# Patient Record
Sex: FEMALE | Race: OTHER | NOT HISPANIC OR LATINO | Employment: FULL TIME | URBAN - METROPOLITAN AREA
[De-identification: names, ages, dates, MRNs, and addresses within clinical notes are randomized per-mention and may not be internally consistent; named-entity substitution may affect disease eponyms.]

---

## 2019-04-15 ENCOUNTER — APPOINTMENT (EMERGENCY)
Dept: RADIOLOGY | Facility: HOSPITAL | Age: 34
End: 2019-04-15
Payer: COMMERCIAL

## 2019-04-15 ENCOUNTER — HOSPITAL ENCOUNTER (EMERGENCY)
Facility: HOSPITAL | Age: 34
Discharge: HOME | End: 2019-04-15
Attending: EMERGENCY MEDICINE
Payer: COMMERCIAL

## 2019-04-15 VITALS
TEMPERATURE: 98.7 F | WEIGHT: 220.24 LBS | HEART RATE: 60 BPM | OXYGEN SATURATION: 98 % | RESPIRATION RATE: 18 BRPM | DIASTOLIC BLOOD PRESSURE: 80 MMHG | SYSTOLIC BLOOD PRESSURE: 148 MMHG

## 2019-04-15 DIAGNOSIS — R07.89 ATYPICAL CHEST PAIN: Primary | ICD-10-CM

## 2019-04-15 LAB
ALBUMIN SERPL BCP-MCNC: 3.6 G/DL (ref 3.5–5)
ALP SERPL-CCNC: 80 U/L (ref 46–116)
ALT SERPL W P-5'-P-CCNC: 42 U/L (ref 12–78)
ANION GAP SERPL CALCULATED.3IONS-SCNC: 9 MMOL/L (ref 4–13)
AST SERPL W P-5'-P-CCNC: 27 U/L (ref 5–45)
ATRIAL RATE: 86 BPM
BASOPHILS # BLD AUTO: 0.04 THOUSANDS/ΜL (ref 0–0.1)
BASOPHILS NFR BLD AUTO: 0 % (ref 0–1)
BILIRUB DIRECT SERPL-MCNC: 0.14 MG/DL (ref 0–0.2)
BILIRUB SERPL-MCNC: 0.98 MG/DL (ref 0.2–1)
BUN SERPL-MCNC: 12 MG/DL (ref 5–25)
CALCIUM SERPL-MCNC: 9.2 MG/DL (ref 8.3–10.1)
CHLORIDE SERPL-SCNC: 103 MMOL/L (ref 100–108)
CO2 SERPL-SCNC: 30 MMOL/L (ref 21–32)
CREAT SERPL-MCNC: 0.82 MG/DL (ref 0.6–1.3)
EOSINOPHIL # BLD AUTO: 0.05 THOUSAND/ΜL (ref 0–0.61)
EOSINOPHIL NFR BLD AUTO: 1 % (ref 0–6)
ERYTHROCYTE [DISTWIDTH] IN BLOOD BY AUTOMATED COUNT: 14.4 % (ref 11.6–15.1)
EXT PREG TEST URINE: NEGATIVE
GFR SERPL CREATININE-BSD FRML MDRD: 95 ML/MIN/1.73SQ M
GLUCOSE SERPL-MCNC: 111 MG/DL (ref 65–140)
HCT VFR BLD AUTO: 35.8 % (ref 34.8–46.1)
HGB BLD-MCNC: 11.6 G/DL (ref 11.5–15.4)
IMM GRANULOCYTES # BLD AUTO: 0.05 THOUSAND/UL (ref 0–0.2)
IMM GRANULOCYTES NFR BLD AUTO: 1 % (ref 0–2)
LIPASE SERPL-CCNC: 100 U/L (ref 73–393)
LYMPHOCYTES # BLD AUTO: 1.67 THOUSANDS/ΜL (ref 0.6–4.47)
LYMPHOCYTES NFR BLD AUTO: 16 % (ref 14–44)
MCH RBC QN AUTO: 27.9 PG (ref 26.8–34.3)
MCHC RBC AUTO-ENTMCNC: 32.4 G/DL (ref 31.4–37.4)
MCV RBC AUTO: 86 FL (ref 82–98)
MONOCYTES # BLD AUTO: 1.01 THOUSAND/ΜL (ref 0.17–1.22)
MONOCYTES NFR BLD AUTO: 10 % (ref 4–12)
NEUTROPHILS # BLD AUTO: 7.72 THOUSANDS/ΜL (ref 1.85–7.62)
NEUTS SEG NFR BLD AUTO: 72 % (ref 43–75)
NRBC BLD AUTO-RTO: 0 /100 WBCS
P AXIS: 50 DEGREES
PLATELET # BLD AUTO: 359 THOUSANDS/UL (ref 149–390)
PMV BLD AUTO: 10.5 FL (ref 8.9–12.7)
POTASSIUM SERPL-SCNC: 3.1 MMOL/L (ref 3.5–5.3)
PR INTERVAL: 156 MS
PROT SERPL-MCNC: 7.9 G/DL (ref 6.4–8.2)
QRS AXIS: 54 DEGREES
QRSD INTERVAL: 86 MS
QT INTERVAL: 380 MS
QTC INTERVAL: 454 MS
RBC # BLD AUTO: 4.16 MILLION/UL (ref 3.81–5.12)
SODIUM SERPL-SCNC: 142 MMOL/L (ref 136–145)
T WAVE AXIS: 48 DEGREES
TROPONIN I SERPL-MCNC: <0.02 NG/ML
VENTRICULAR RATE: 86 BPM
WBC # BLD AUTO: 10.54 THOUSAND/UL (ref 4.31–10.16)

## 2019-04-15 PROCEDURE — 99285 EMERGENCY DEPT VISIT HI MDM: CPT

## 2019-04-15 PROCEDURE — 99284 EMERGENCY DEPT VISIT MOD MDM: CPT | Performed by: EMERGENCY MEDICINE

## 2019-04-15 PROCEDURE — 81025 URINE PREGNANCY TEST: CPT | Performed by: EMERGENCY MEDICINE

## 2019-04-15 PROCEDURE — 96361 HYDRATE IV INFUSION ADD-ON: CPT

## 2019-04-15 PROCEDURE — 71046 X-RAY EXAM CHEST 2 VIEWS: CPT

## 2019-04-15 PROCEDURE — 93010 ELECTROCARDIOGRAM REPORT: CPT | Performed by: INTERNAL MEDICINE

## 2019-04-15 PROCEDURE — 80076 HEPATIC FUNCTION PANEL: CPT | Performed by: EMERGENCY MEDICINE

## 2019-04-15 PROCEDURE — 36415 COLL VENOUS BLD VENIPUNCTURE: CPT | Performed by: EMERGENCY MEDICINE

## 2019-04-15 PROCEDURE — 80048 BASIC METABOLIC PNL TOTAL CA: CPT | Performed by: EMERGENCY MEDICINE

## 2019-04-15 PROCEDURE — 83690 ASSAY OF LIPASE: CPT | Performed by: EMERGENCY MEDICINE

## 2019-04-15 PROCEDURE — 96374 THER/PROPH/DIAG INJ IV PUSH: CPT

## 2019-04-15 PROCEDURE — 93005 ELECTROCARDIOGRAM TRACING: CPT

## 2019-04-15 PROCEDURE — 84484 ASSAY OF TROPONIN QUANT: CPT | Performed by: EMERGENCY MEDICINE

## 2019-04-15 PROCEDURE — 85025 COMPLETE CBC W/AUTO DIFF WBC: CPT | Performed by: EMERGENCY MEDICINE

## 2019-04-15 RX ORDER — ONDANSETRON 2 MG/ML
4 INJECTION INTRAMUSCULAR; INTRAVENOUS ONCE
Status: COMPLETED | OUTPATIENT
Start: 2019-04-15 | End: 2019-04-15

## 2019-04-15 RX ORDER — LORAZEPAM 0.5 MG/1
0.5 TABLET ORAL ONCE
Status: DISCONTINUED | OUTPATIENT
Start: 2019-04-15 | End: 2019-04-15

## 2019-04-15 RX ORDER — POTASSIUM CHLORIDE 20 MEQ/1
40 TABLET, EXTENDED RELEASE ORAL ONCE
Status: COMPLETED | OUTPATIENT
Start: 2019-04-15 | End: 2019-04-15

## 2019-04-15 RX ADMIN — ONDANSETRON 4 MG: 2 INJECTION INTRAMUSCULAR; INTRAVENOUS at 17:00

## 2019-04-15 RX ADMIN — SODIUM CHLORIDE 1000 ML: 0.9 INJECTION, SOLUTION INTRAVENOUS at 16:34

## 2019-04-15 RX ADMIN — POTASSIUM CHLORIDE 40 MEQ: 1500 TABLET, EXTENDED RELEASE ORAL at 17:45

## 2022-06-28 ENCOUNTER — TELEPHONE (OUTPATIENT)
Dept: HEMATOLOGY ONCOLOGY | Facility: CLINIC | Age: 37
End: 2022-06-28

## 2022-06-28 NOTE — TELEPHONE ENCOUNTER
New Patient Intake Form   Patient Details:    Mc Thapa  1985    Appointment Information   Who is calling to schedule? Patient   If not self, what is the caller's name?   self   DID YOU CONFIRM INSURANCE WITH PATIENT? yes   Referring provider Dr Ashley Oliver   What is the diagnosis? White cells & platelets elevated     Is there a confirmed tissue diagnosis?   no     Is there a biopsy ordered or pending? Please specify dates  If yes, route to /OCC   no     Is patient aware of diagnosis? yes     Have you had any imaging or labs done? If yes, where? (If imaging done outside of [facility], please remind patient to bring a disk ) Yes at [facility]     If imaging done at outside facility, did you instruct patient to obtain discs and bring to visit? yes   Have you been seen by another Oncologist/Hematologist?  If so, who and where? no   Are the records in [facility] or Care Everywhere? Yes   Does the patient have records at another facility/hospital?    If yes, Name of facility, city and state where facility is located  Yes, [facility]     Did you instruct patient to have records faxed to rightfax and provide rightfax number?   yes   Preferred [place]   Is the patient willing to be seen by another provider? (This is for breast patients only) n/a     Did you send new patient paperwork? Email or mail?  Not required   Miscellaneous Information: Scheduled appmt 6/29 9AM Paula at [facility]

## 2022-06-29 ENCOUNTER — CONSULT (OUTPATIENT)
Dept: HEMATOLOGY ONCOLOGY | Facility: CLINIC | Age: 37
End: 2022-06-29
Payer: COMMERCIAL

## 2022-06-29 ENCOUNTER — TELEPHONE (OUTPATIENT)
Dept: HEMATOLOGY ONCOLOGY | Facility: CLINIC | Age: 37
End: 2022-06-29

## 2022-06-29 VITALS
TEMPERATURE: 98.4 F | HEIGHT: 67 IN | HEART RATE: 96 BPM | WEIGHT: 230 LBS | OXYGEN SATURATION: 97 % | DIASTOLIC BLOOD PRESSURE: 78 MMHG | RESPIRATION RATE: 16 BRPM | SYSTOLIC BLOOD PRESSURE: 140 MMHG | BODY MASS INDEX: 36.1 KG/M2

## 2022-06-29 DIAGNOSIS — D50.0 IRON DEFICIENCY ANEMIA DUE TO CHRONIC BLOOD LOSS: Primary | ICD-10-CM

## 2022-06-29 DIAGNOSIS — D50.0 IRON DEFICIENCY ANEMIA DUE TO CHRONIC BLOOD LOSS: ICD-10-CM

## 2022-06-29 DIAGNOSIS — D72.829 LEUKOCYTOSIS, UNSPECIFIED TYPE: Primary | ICD-10-CM

## 2022-06-29 DIAGNOSIS — E53.8 VITAMIN B 12 DEFICIENCY: ICD-10-CM

## 2022-06-29 DIAGNOSIS — E53.8 LOW FOLATE: ICD-10-CM

## 2022-06-29 DIAGNOSIS — D75.839 THROMBOCYTOSIS: ICD-10-CM

## 2022-06-29 PROCEDURE — 99244 OFF/OP CNSLTJ NEW/EST MOD 40: CPT | Performed by: NURSE PRACTITIONER

## 2022-06-29 RX ORDER — FERROUS SULFATE 325(65) MG
325 TABLET ORAL
COMMUNITY

## 2022-06-29 RX ORDER — SODIUM CHLORIDE 9 MG/ML
20 INJECTION, SOLUTION INTRAVENOUS ONCE
Status: CANCELLED | OUTPATIENT
Start: 2022-07-22

## 2022-06-29 RX ORDER — MELATONIN
1000 DAILY
COMMUNITY

## 2022-06-29 NOTE — PROGRESS NOTES
[address] [facility] [address] [address] [phone]  Hematology Ambulatory Consult  Mc Thapa, 1985, [id number]  6/29/2022      Assessment and Plan   1  Leukocytosis, unspecified type  2  Thrombocytosis  3  Low folate  4  Vitamin B 12 deficiency  5  Iron deficiency anemia due to chronic blood loss   Patient is a 29-year-old female with a history of anxiety, hypothyroid, migraines, vitamin-D deficiency and iron-deficiency who was referred to us for further evaluation of thrombocytosis and leukocytosis  Patient states she has a history of anemia since having her daughter 13 years ago  She reported postpartum blood loss requiring blood transfusions  She has been on oral iron supplementation on and off however this causes constipation  She reports having heavy menses changing her pads every 2 hours on her heaviest days  Her menses last 5-6 days every 21 days  She does have increased fatigue and dizziness during her menses  Otherwise he reports frequent headaches and anxiety  Per chart review CBC reveals an intermittent elevated white blood cell count since at least 2019 ranging between 10 54-12 1, hemoglobin most recently 11 4 otherwise has been normal, platelets 084-115, otherwise normal differential   Patient's most recent ferritin level was 9 however I do not have an iron saturation  I suspect it will be low  We will request a full iron panel now  We discussed etiologies of thrombocytosis and leukocytosis including nutritional deficiencies, inflammatory conditions, less likely a bone marrow dysfunction or malignancy  Patient does not have a history of smoking, gastric bypass, blood in her stools and reports adequate dietary intake  She has not had a colonoscopy  I suspect her iron-deficiency anemia is likely secondary to heavy menses  She has a consult with gynecology to discuss management in the near future     In anticipation that her iron saturation and ferritin levels will be low we will make arrangements for Venofer iron infusions  Patient states she does have a history of anxiety and adverse reactions to some medications  We will administer at a lower dose 200 mg IV x5  I discussed indications and adverse reactions including shortness of breath, chest heaviness, headache, muscle cramping etc  I advised her to contact our office if she has any of these symptoms we will add premedications to the next infusion  I will also check B12 level and add if needed  Otherwise we will plan to see patient in 6 months with three-month interval labs managing as indicated  Patient verbalized understanding and agreement with the plan  - Folate; Future  - Iron Panel (Includes Ferritin, Iron Sat%, Iron, and TIBC); Standing  - Vitamin B12; Future  - CBC and differential; Future  - Iron Panel (Includes Ferritin, Iron Sat%, Iron, and TIBC)    Barrier(s) to care: None  The patient is able to self care  [address] [place] [address]    Subjective     Chief Complaint   Patient presents with    Consult     Referring provider    No referring provider defined for this encounter  History of present illness:  Patient is a 51-year-old female with a history of anxiety, hypothyroid, migraines, vitamin-D deficiency and iron-deficiency who was referred to us for further evaluation of thrombocytosis and leukocytosis  06/29/22:  Clinically stable    Review of Systems   Constitutional: Positive for fatigue  Negative for activity change, appetite change, fever and unexpected weight change  Respiratory: Negative for cough and shortness of breath  Cardiovascular: Negative for chest pain and leg swelling  Gastrointestinal: Positive for constipation  Negative for abdominal pain, diarrhea and nausea  Endocrine: Negative for cold intolerance and heat intolerance  Musculoskeletal: Negative for arthralgias and myalgias  Skin: Negative  Neurological: Positive for dizziness and headaches  Negative for weakness  Hematological: Negative for adenopathy  Does not bruise/bleed easily  Past Medical History:   Diagnosis Date    Anemia      No past surgical history on file  No family history on file  Social History     Socioeconomic History    Marital status: Single     Spouse name: None    Number of children: None    Years of education: None    Highest education level: None   Occupational History    None   Tobacco Use    Smoking status: Never Smoker    Smokeless tobacco: Never Used   Substance and Sexual Activity    Alcohol use: Never    Drug use: Never    Sexual activity: None   Other Topics Concern    None   Social History Narrative    None     Social Determinants of Health     Financial Resource Strain: Not on file   Food Insecurity: Not on file   Transportation Needs: Not on file   Physical Activity: Not on file   Stress: Not on file   Social Connections: Not on file   Intimate Partner Violence: Not on file   Housing Stability: Not on file         Current Outpatient Medications:     cholecalciferol (VITAMIN D3) 1,000 units tablet, Take 1,000 Units by mouth daily, Disp: , Rfl:     Cholecalciferol 1 25 MG (53727 UT) capsule, Take 1 capsule by mouth once a week, Disp: , Rfl:     ferrous sulfate 325 (65 Fe) mg tablet, Take 325 mg by mouth daily with breakfast, Disp: , Rfl:     ibuprofen (MOTRIN) 100 mg/5 mL suspension, Take by mouth every 6 (six) hours as needed for mild pain, Disp: , Rfl:   Allergies   Allergen Reactions    Psyllium Itching     Chest tightness, itchy throat, sneezing       Objective   /78 (BP Location: Left arm, Patient Position: Sitting, Cuff Size: Standard)   Pulse 96   Temp 98 4 °F (36 9 °C) (Temporal)   Resp 16   Ht 5' 7" (1 702 m)   Wt 104 kg (230 lb)   SpO2 97%   BMI 36 02 kg/m²   Physical Exam  Vitals reviewed  Constitutional:       Appearance: Normal appearance  She is well-developed  HENT:      Head: Normocephalic and atraumatic  Eyes:      Conjunctiva/sclera: Conjunctivae normal       Pupils: Pupils are equal, round, and reactive to light  Cardiovascular:      Rate and Rhythm: Normal rate and regular rhythm  Pulses: Normal pulses  Heart sounds: Normal heart sounds  No murmur heard  Pulmonary:      Effort: Pulmonary effort is normal  No respiratory distress  Breath sounds: Normal breath sounds  Abdominal:      General: Bowel sounds are normal       Palpations: Abdomen is soft  Musculoskeletal:         General: Normal range of motion  Cervical back: Normal range of motion and neck supple  Lymphadenopathy:      Cervical: No cervical adenopathy  Skin:     General: Skin is warm and dry  Capillary Refill: Capillary refill takes less than 2 seconds  Neurological:      Mental Status: She is alert and oriented to person, place, and time  Psychiatric:         Behavior: Behavior normal        Please note: This report has been generated by a voice recognition software system  Therefore there may be syntax, spelling, and/or grammatical errors  Please call if you have any questions

## 2022-06-29 NOTE — TELEPHONE ENCOUNTER
Adriana Murray! ! I just got off the phone with patient, she is okay to do any day as long as its in the morning to be seen sooner than 7/27  Thank you!

## 2022-06-29 NOTE — TELEPHONE ENCOUNTER
While we try to accommodate patient requests, our priority is to schedule treatment according to Doctor's orders and site availability  1  Does the Provider use the intake sheet or checkout note? no  2  What would be a preferred day of the week that would work best for your infusion appointment? Wednesday  3  Do you prefer mornings or afternoons for your appointments? Morning  4  Are there any days or dates that do not work for your schedule, including any upcoming vacations? none  5  We are going to try our best to schedule you at the infusion center closest to your home  In the event that we are unable to what would be your next preferred infusion site or sites? 1  [facility]  2  [facility]  3  [facility]    6  Do you have transportation to take you to all of your appointments? yes  7  Would you like the infusion center to draw labs from your port? (disregard if patient doesn't have a port or need labs for infusion appointment) n/a    Pt  Will need a 26 week f/u mathewt   Clifford Becker

## 2022-07-21 ENCOUNTER — APPOINTMENT (OUTPATIENT)
Dept: LAB | Facility: CLINIC | Age: 37
End: 2022-07-21
Payer: COMMERCIAL

## 2022-07-21 DIAGNOSIS — E53.8 VITAMIN B 12 DEFICIENCY: ICD-10-CM

## 2022-07-21 DIAGNOSIS — D72.829 LEUKOCYTOSIS, UNSPECIFIED TYPE: ICD-10-CM

## 2022-07-21 DIAGNOSIS — E53.8 LOW FOLATE: ICD-10-CM

## 2022-07-21 DIAGNOSIS — D75.839 THROMBOCYTOSIS: ICD-10-CM

## 2022-07-21 DIAGNOSIS — D50.0 IRON DEFICIENCY ANEMIA DUE TO CHRONIC BLOOD LOSS: ICD-10-CM

## 2022-07-21 LAB
FERRITIN SERPL-MCNC: 15 NG/ML (ref 8–388)
FOLATE SERPL-MCNC: 14.6 NG/ML (ref 3.1–17.5)
IRON SATN MFR SERPL: 6 % (ref 15–50)
IRON SERPL-MCNC: 26 UG/DL (ref 50–170)
TIBC SERPL-MCNC: 409 UG/DL (ref 250–450)
VIT B12 SERPL-MCNC: 220 PG/ML (ref 100–900)

## 2022-07-21 PROCEDURE — 82746 ASSAY OF FOLIC ACID SERUM: CPT

## 2022-07-21 PROCEDURE — 82728 ASSAY OF FERRITIN: CPT

## 2022-07-21 PROCEDURE — 83550 IRON BINDING TEST: CPT

## 2022-07-21 PROCEDURE — 82607 VITAMIN B-12: CPT

## 2022-07-21 PROCEDURE — 36415 COLL VENOUS BLD VENIPUNCTURE: CPT

## 2022-07-21 PROCEDURE — 83540 ASSAY OF IRON: CPT

## 2022-07-22 ENCOUNTER — HOSPITAL ENCOUNTER (OUTPATIENT)
Dept: INFUSION CENTER | Facility: HOSPITAL | Age: 37
Discharge: HOME | End: 2022-07-22
Attending: INTERNAL MEDICINE
Payer: COMMERCIAL

## 2022-07-22 VITALS
DIASTOLIC BLOOD PRESSURE: 72 MMHG | HEART RATE: 86 BPM | RESPIRATION RATE: 18 BRPM | SYSTOLIC BLOOD PRESSURE: 128 MMHG | TEMPERATURE: 96.5 F

## 2022-07-22 DIAGNOSIS — D50.0 IRON DEFICIENCY ANEMIA DUE TO CHRONIC BLOOD LOSS: Primary | ICD-10-CM

## 2022-07-22 PROCEDURE — 96365 THER/PROPH/DIAG IV INF INIT: CPT

## 2022-07-22 RX ORDER — SODIUM CHLORIDE 9 MG/ML
20 INJECTION, SOLUTION INTRAVENOUS ONCE
Status: COMPLETED | OUTPATIENT
Start: 2022-07-22 | End: 2022-07-22

## 2022-07-22 RX ORDER — SODIUM CHLORIDE 9 MG/ML
20 INJECTION, SOLUTION INTRAVENOUS ONCE
Status: CANCELLED | OUTPATIENT
Start: 2022-07-27

## 2022-07-22 RX ADMIN — IRON SUCROSE 200 MG: 20 INJECTION, SOLUTION INTRAVENOUS at 09:08

## 2022-07-22 RX ADMIN — SODIUM CHLORIDE 20 ML/HR: 0.9 INJECTION, SOLUTION INTRAVENOUS at 09:07

## 2022-07-27 ENCOUNTER — HOSPITAL ENCOUNTER (OUTPATIENT)
Dept: INFUSION CENTER | Facility: HOSPITAL | Age: 37
Discharge: HOME | End: 2022-07-27
Attending: INTERNAL MEDICINE
Payer: COMMERCIAL

## 2022-07-27 VITALS
TEMPERATURE: 97.5 F | DIASTOLIC BLOOD PRESSURE: 66 MMHG | RESPIRATION RATE: 18 BRPM | HEART RATE: 82 BPM | SYSTOLIC BLOOD PRESSURE: 124 MMHG

## 2022-07-27 DIAGNOSIS — D50.0 IRON DEFICIENCY ANEMIA DUE TO CHRONIC BLOOD LOSS: Primary | ICD-10-CM

## 2022-07-27 PROCEDURE — 96365 THER/PROPH/DIAG IV INF INIT: CPT

## 2022-07-27 RX ORDER — SODIUM CHLORIDE 9 MG/ML
20 INJECTION, SOLUTION INTRAVENOUS ONCE
Status: CANCELLED | OUTPATIENT
Start: 2022-08-03

## 2022-07-27 RX ORDER — SODIUM CHLORIDE 9 MG/ML
20 INJECTION, SOLUTION INTRAVENOUS ONCE
Status: COMPLETED | OUTPATIENT
Start: 2022-07-27 | End: 2022-07-27

## 2022-07-27 RX ADMIN — IRON SUCROSE 200 MG: 20 INJECTION, SOLUTION INTRAVENOUS at 09:52

## 2022-07-27 RX ADMIN — SODIUM CHLORIDE 20 ML/HR: 9 INJECTION, SOLUTION INTRAVENOUS at 09:52

## 2022-08-04 ENCOUNTER — HOSPITAL ENCOUNTER (OUTPATIENT)
Dept: INFUSION CENTER | Facility: HOSPITAL | Age: 37
Discharge: HOME | End: 2022-08-04
Attending: INTERNAL MEDICINE
Payer: COMMERCIAL

## 2022-08-04 VITALS
TEMPERATURE: 96.9 F | SYSTOLIC BLOOD PRESSURE: 128 MMHG | DIASTOLIC BLOOD PRESSURE: 64 MMHG | RESPIRATION RATE: 18 BRPM | HEART RATE: 84 BPM

## 2022-08-04 DIAGNOSIS — D50.0 IRON DEFICIENCY ANEMIA DUE TO CHRONIC BLOOD LOSS: Primary | ICD-10-CM

## 2022-08-04 PROCEDURE — 96365 THER/PROPH/DIAG IV INF INIT: CPT

## 2022-08-04 RX ORDER — SODIUM CHLORIDE 9 MG/ML
20 INJECTION, SOLUTION INTRAVENOUS ONCE
Status: CANCELLED | OUTPATIENT
Start: 2022-08-10

## 2022-08-04 RX ORDER — SODIUM CHLORIDE 9 MG/ML
20 INJECTION, SOLUTION INTRAVENOUS ONCE
Status: COMPLETED | OUTPATIENT
Start: 2022-08-04 | End: 2022-08-04

## 2022-08-04 RX ADMIN — IRON SUCROSE 200 MG: 20 INJECTION, SOLUTION INTRAVENOUS at 08:24

## 2022-08-04 RX ADMIN — SODIUM CHLORIDE 20 ML/HR: 0.9 INJECTION, SOLUTION INTRAVENOUS at 08:24

## 2022-08-10 ENCOUNTER — HOSPITAL ENCOUNTER (OUTPATIENT)
Dept: INFUSION CENTER | Facility: HOSPITAL | Age: 37
Discharge: HOME | End: 2022-08-10
Attending: INTERNAL MEDICINE
Payer: COMMERCIAL

## 2022-08-10 VITALS
DIASTOLIC BLOOD PRESSURE: 68 MMHG | SYSTOLIC BLOOD PRESSURE: 122 MMHG | TEMPERATURE: 97.3 F | RESPIRATION RATE: 18 BRPM | HEART RATE: 76 BPM

## 2022-08-10 DIAGNOSIS — D50.0 IRON DEFICIENCY ANEMIA DUE TO CHRONIC BLOOD LOSS: Primary | ICD-10-CM

## 2022-08-10 PROCEDURE — 96365 THER/PROPH/DIAG IV INF INIT: CPT

## 2022-08-10 RX ORDER — SODIUM CHLORIDE 9 MG/ML
20 INJECTION, SOLUTION INTRAVENOUS ONCE
Status: COMPLETED | OUTPATIENT
Start: 2022-08-10 | End: 2022-08-10

## 2022-08-10 RX ORDER — SODIUM CHLORIDE 9 MG/ML
20 INJECTION, SOLUTION INTRAVENOUS ONCE
Status: CANCELLED | OUTPATIENT
Start: 2022-08-17

## 2022-08-10 RX ADMIN — IRON SUCROSE 200 MG: 20 INJECTION, SOLUTION INTRAVENOUS at 09:50

## 2022-08-10 RX ADMIN — SODIUM CHLORIDE 20 ML/HR: 0.9 INJECTION, SOLUTION INTRAVENOUS at 09:50

## 2022-08-17 ENCOUNTER — HOSPITAL ENCOUNTER (OUTPATIENT)
Dept: INFUSION CENTER | Facility: HOSPITAL | Age: 37
Discharge: HOME | End: 2022-08-17
Attending: INTERNAL MEDICINE
Payer: COMMERCIAL

## 2022-08-17 VITALS
HEART RATE: 82 BPM | SYSTOLIC BLOOD PRESSURE: 144 MMHG | TEMPERATURE: 98.2 F | DIASTOLIC BLOOD PRESSURE: 85 MMHG | RESPIRATION RATE: 18 BRPM

## 2022-08-17 DIAGNOSIS — D50.0 IRON DEFICIENCY ANEMIA DUE TO CHRONIC BLOOD LOSS: Primary | ICD-10-CM

## 2022-08-17 PROCEDURE — 96365 THER/PROPH/DIAG IV INF INIT: CPT

## 2022-08-17 RX ORDER — SODIUM CHLORIDE 9 MG/ML
20 INJECTION, SOLUTION INTRAVENOUS ONCE
Status: COMPLETED | OUTPATIENT
Start: 2022-08-17 | End: 2022-08-17

## 2022-08-17 RX ORDER — SODIUM CHLORIDE 9 MG/ML
20 INJECTION, SOLUTION INTRAVENOUS ONCE
Status: CANCELLED | OUTPATIENT
Start: 2022-08-17

## 2022-08-17 RX ADMIN — SODIUM CHLORIDE 20 ML/HR: 0.9 INJECTION, SOLUTION INTRAVENOUS at 09:41

## 2022-08-17 RX ADMIN — IRON SUCROSE 200 MG: 20 INJECTION, SOLUTION INTRAVENOUS at 09:42

## 2022-12-27 ENCOUNTER — TELEPHONE (OUTPATIENT)
Dept: HEMATOLOGY ONCOLOGY | Facility: CLINIC | Age: 37
End: 2022-12-27

## 2022-12-27 ENCOUNTER — APPOINTMENT (OUTPATIENT)
Dept: LAB | Facility: CLINIC | Age: 37
End: 2022-12-27

## 2022-12-27 DIAGNOSIS — D50.0 IRON DEFICIENCY ANEMIA DUE TO CHRONIC BLOOD LOSS: ICD-10-CM

## 2022-12-27 DIAGNOSIS — D50.0 IRON DEFICIENCY ANEMIA DUE TO CHRONIC BLOOD LOSS: Primary | ICD-10-CM

## 2022-12-27 LAB
BASOPHILS # BLD AUTO: 0.06 THOUSANDS/ÂΜL (ref 0–0.1)
BASOPHILS NFR BLD AUTO: 1 % (ref 0–1)
EOSINOPHIL # BLD AUTO: 0.09 THOUSAND/ÂΜL (ref 0–0.61)
EOSINOPHIL NFR BLD AUTO: 1 % (ref 0–6)
ERYTHROCYTE [DISTWIDTH] IN BLOOD BY AUTOMATED COUNT: 13.6 % (ref 11.6–15.1)
HCT VFR BLD AUTO: 39.2 % (ref 34.8–46.1)
HGB BLD-MCNC: 12.5 G/DL (ref 11.5–15.4)
IMM GRANULOCYTES # BLD AUTO: 0.05 THOUSAND/UL (ref 0–0.2)
IMM GRANULOCYTES NFR BLD AUTO: 1 % (ref 0–2)
LYMPHOCYTES # BLD AUTO: 3.18 THOUSANDS/ÂΜL (ref 0.6–4.47)
LYMPHOCYTES NFR BLD AUTO: 29 % (ref 14–44)
MCH RBC QN AUTO: 28.1 PG (ref 26.8–34.3)
MCHC RBC AUTO-ENTMCNC: 31.9 G/DL (ref 31.4–37.4)
MCV RBC AUTO: 88 FL (ref 82–98)
MONOCYTES # BLD AUTO: 0.79 THOUSAND/ÂΜL (ref 0.17–1.22)
MONOCYTES NFR BLD AUTO: 7 % (ref 4–12)
NEUTROPHILS # BLD AUTO: 6.93 THOUSANDS/ÂΜL (ref 1.85–7.62)
NEUTS SEG NFR BLD AUTO: 61 % (ref 43–75)
NRBC BLD AUTO-RTO: 0 /100 WBCS
PLATELET # BLD AUTO: 434 THOUSANDS/UL (ref 149–390)
PMV BLD AUTO: 10.7 FL (ref 8.9–12.7)
RBC # BLD AUTO: 4.45 MILLION/UL (ref 3.81–5.12)
WBC # BLD AUTO: 11.1 THOUSAND/UL (ref 4.31–10.16)

## 2022-12-27 NOTE — TELEPHONE ENCOUNTER
Spoke with patient stating we do not have recent labs completed for her appointment tomorrow with [facility]  Patient stated she could go to a [facility]'s lab today to complete labs  Labs entered

## 2022-12-28 ENCOUNTER — OFFICE VISIT (OUTPATIENT)
Dept: HEMATOLOGY ONCOLOGY | Facility: CLINIC | Age: 37
End: 2022-12-28

## 2022-12-28 ENCOUNTER — TELEPHONE (OUTPATIENT)
Dept: HEMATOLOGY ONCOLOGY | Facility: CLINIC | Age: 37
End: 2022-12-28

## 2022-12-28 VITALS
SYSTOLIC BLOOD PRESSURE: 120 MMHG | RESPIRATION RATE: 18 BRPM | HEART RATE: 63 BPM | HEIGHT: 68 IN | WEIGHT: 233 LBS | BODY MASS INDEX: 35.31 KG/M2 | OXYGEN SATURATION: 98 % | DIASTOLIC BLOOD PRESSURE: 70 MMHG

## 2022-12-28 DIAGNOSIS — D72.829 LEUKOCYTOSIS, UNSPECIFIED TYPE: ICD-10-CM

## 2022-12-28 DIAGNOSIS — D75.839 THROMBOCYTOSIS: ICD-10-CM

## 2022-12-28 DIAGNOSIS — E53.8 VITAMIN B 12 DEFICIENCY: ICD-10-CM

## 2022-12-28 DIAGNOSIS — D50.0 IRON DEFICIENCY ANEMIA DUE TO CHRONIC BLOOD LOSS: Primary | ICD-10-CM

## 2022-12-28 NOTE — PROGRESS NOTES
[address] [facility] [address]  [facility] [id number] [phone]  Hematology Ambulatory Follow-Up  Mc Thapa, 1985, [id number]  12/28/2022    Assessment/Plan:    1  Iron deficiency anemia due to chronic blood loss  2  Leukocytosis, unspecified type  3  Thrombocytosis  4  Vitamin B 12 deficiency   Patient is a 29-year-old female with history of leukocytosis, thrombocytosis, vitamin B12 deficiency, and iron deficiency likely secondary to menorrhagia  She does not have a history of gastric bypass surgery and reports adequate dietary iron intake  She completed 5 cycles of Venofer 200 mg IV in August 2022  Most recent iron saturation is 16% improved from 6% in July, ferritin level 34 improved from 15 in July  Overall she is feeling well and able to function without restriction  We discussed possible maintenance infusions versus oral iron  Patient states she would prefer oral iron however she does have trouble with constipation  I reviewed she may take a stool softener or prune juice and hydrate well  We will repeat blood work in 3 months and again in 6 months and see her shortly after  If all is stable we will likely discharge back to her primary care physician  Patient verbalized understanding and is in agreement with plan  - CBC; Standing  - Iron Panel (Includes Ferritin, Iron Sat%, Iron, and TIBC); Standing  - Vitamin B12; Standing  - CBC  - Iron Panel (Includes Ferritin, Iron Sat%, Iron, and TIBC)  - Vitamin B12    Barrier(s) to care: None  The patient is able to self care  [address]  Network    Interval history: Medically stable    Review of Systems   Constitutional: Negative for activity change, appetite change, fatigue, fever and unexpected weight change  Respiratory: Negative for cough and shortness of breath      Cardiovascular: Negative for chest pain and leg swelling  Gastrointestinal: Negative for abdominal pain, constipation, diarrhea and nausea  Endocrine: Negative for cold intolerance and heat intolerance  Musculoskeletal: Negative for arthralgias and myalgias  Skin: Negative  Neurological: Negative for dizziness, weakness and headaches  Hematological: Negative for adenopathy  Does not bruise/bleed easily  Patient Active Problem List   Diagnosis   • Iron deficiency anemia due to chronic blood loss     Past Medical History:   Diagnosis Date   • Anemia      No past surgical history on file  No family history on file  Current Outpatient Medications:   •  cholecalciferol (VITAMIN D3) 1,000 units tablet, Take 1,000 Units by mouth daily, Disp: , Rfl:   •  ibuprofen (MOTRIN) 100 mg/5 mL suspension, Take by mouth every 6 (six) hours as needed for mild pain, Disp: , Rfl:   •  Cholecalciferol 1 25 MG (55196 UT) capsule, Take 1 capsule by mouth once a week, Disp: , Rfl:   •  ferrous sulfate 325 (65 Fe) mg tablet, Take 325 mg by mouth daily with breakfast (Patient not taking: Reported on 12/28/2022), Disp: , Rfl:     Allergies   Allergen Reactions   • Psyllium Itching     Chest tightness, itchy throat, sneezing     Objective:  /70 (BP Location: Left arm, Patient Position: Sitting, Cuff Size: Adult)   Pulse 63   Resp 18   Ht 5' 8" (1 727 m)   Wt 106 kg (233 lb)   SpO2 98%   BMI 35 43 kg/m²    Physical Exam  Constitutional:       Appearance: She is well-developed  HENT:      Head: Normocephalic and atraumatic  Eyes:      Conjunctiva/sclera: Conjunctivae normal       Pupils: Pupils are equal, round, and reactive to light  Pulmonary:      Effort: Pulmonary effort is normal  No respiratory distress  Musculoskeletal:         General: Normal range of motion  Cervical back: Normal range of motion  Skin:     General: Skin is warm and dry  Neurological:      Mental Status: She is alert and oriented to person, place, and time  Psychiatric:         Behavior: Behavior normal      Result Review  Labs: Ancillary Orders on 12/09/2022   Component Date Value Ref Range Status   • Iron Saturation 12/27/2022 16  15 - 50 % Final   • TIBC 12/27/2022 361  250 - 450 ug/dL Final   • Iron 12/27/2022 57  50 - 170 ug/dL Final    Patients treated with metal-binding drugs (ie  Deferoxamine) may have depressed iron values  • Ferritin 12/27/2022 34  8 - 388 ng/mL Final     Please note: This report has been generated by a voice recognition software system  Therefore there may be syntax, spelling, and/or grammatical errors  Please call if you have any questions

## 2023-01-10 ENCOUNTER — HOSPITAL ENCOUNTER (EMERGENCY)
Facility: HOSPITAL | Age: 38
Discharge: HOME | End: 2023-01-10
Attending: EMERGENCY MEDICINE

## 2023-01-10 VITALS
RESPIRATION RATE: 18 BRPM | HEART RATE: 91 BPM | TEMPERATURE: 98.6 F | SYSTOLIC BLOOD PRESSURE: 179 MMHG | DIASTOLIC BLOOD PRESSURE: 110 MMHG | OXYGEN SATURATION: 97 %

## 2023-01-10 DIAGNOSIS — N61.1 BREAST ABSCESS: ICD-10-CM

## 2023-01-10 DIAGNOSIS — N61.0 CELLULITIS OF LEFT BREAST: Primary | ICD-10-CM

## 2023-01-10 RX ORDER — CEPHALEXIN 500 MG/1
500 CAPSULE ORAL EVERY 6 HOURS SCHEDULED
Qty: 40 CAPSULE | Refills: 0 | Status: SHIPPED | OUTPATIENT
Start: 2023-01-10 | End: 2023-01-20

## 2023-01-10 RX ORDER — CEPHALEXIN 250 MG/1
500 CAPSULE ORAL ONCE
Status: COMPLETED | OUTPATIENT
Start: 2023-01-10 | End: 2023-01-10

## 2023-01-10 RX ADMIN — CEPHALEXIN 500 MG: 250 CAPSULE ORAL at 20:08

## 2023-01-11 NOTE — ED PROVIDER NOTES
History  Chief Complaint   Patient presents with   • Breast Pain      Complaints of left nipple/breast pain  States that she has piercing and torn piercing in shower and  now has a swelling lump, redness, pain and was having drainage  Patient did call pcp on Friday and was started on abx  Still taking on day 4- pcp referred to ED for increase swelling and pain  Patient is a 70-year-old female with no significant PMH that presents to the emergency department with left breast pain  She reports that she has had a nipple piercing for the last 2 years but reports that roughly 7 days ago was in the shower when her loofah caught her left nipple piercing and tore the skin a small amount  She noticed over the next couple of days that the area around the piercing started to become red, warm, and swollen and began to drain a small amount of yellow discharge  She called her primary care provider who started her on Bactrim which she has been taking for the last 3 days  She reports that since starting her Bactrim her symptoms have progressively worsened and her pain and swelling has become more severe  She reports feeling a firm lump below her left nipple  She denies any fevers but reports that since starting the antibiotic she feels nauseated  Overall she otherwise feels well  She has been applying warm compresses to left breast but has not had any significant drainage for the past few days  She denies any recent illnesses  Prior to Admission Medications   Prescriptions Last Dose Informant Patient Reported? Taking?    Cholecalciferol 1 25 MG (88142 UT) capsule   Yes No   Sig: Take 1 capsule by mouth once a week   cholecalciferol (VITAMIN D3) 1,000 units tablet   Yes No   Sig: Take 1,000 Units by mouth daily   ferrous sulfate 325 (65 Fe) mg tablet   Yes No   Sig: Take 325 mg by mouth daily with breakfast   Patient not taking: Reported on 12/28/2022   ibuprofen (MOTRIN) 100 mg/5 mL suspension   Yes No   Sig: Take by mouth every 6 (six) hours as needed for mild pain      Facility-Administered Medications: None       Past Medical History:   Diagnosis Date   • Anemia        No past surgical history on file  No family history on file  I have reviewed and agree with the history as documented  E-Cigarette/Vaping     E-Cigarette/Vaping Substances     Social History     Tobacco Use   • Smoking status: Never   • Smokeless tobacco: Never   Substance Use Topics   • Alcohol use: Yes     Alcohol/week: 1 0 standard drink     Types: 1 Standard drinks or equivalent per week     Comment: Socially   • Drug use: Never        Review of Systems   Constitutional: Negative for chills, fatigue and fever  Respiratory: Negative for cough and shortness of breath  Cardiovascular: Negative for chest pain and palpitations  Gastrointestinal: Positive for nausea  Negative for diarrhea and vomiting  Musculoskeletal: Negative for arthralgias and back pain  Skin: Positive for color change (L nipple redness)  Negative for rash  Swelling of the left nipple   All other systems reviewed and are negative  Physical Exam  ED Triage Vitals   Temperature Pulse Respirations Blood Pressure SpO2   01/10/23 1859 01/10/23 1902 01/10/23 1902 01/10/23 1902 01/10/23 1902   98 6 °F (37 °C) 91 18 (!) 179/110 97 %      Temp src Heart Rate Source Patient Position - Orthostatic VS BP Location FiO2 (%)   -- 01/10/23 1902 01/10/23 1902 01/10/23 1902 --    Monitor Sitting Left arm       Pain Score       --                    Orthostatic Vital Signs  Vitals:    01/10/23 1902   BP: (!) 179/110   Pulse: 91   Patient Position - Orthostatic VS: Sitting       Physical Exam  Vitals and nursing note reviewed  Exam conducted with a chaperone present  Constitutional:       General: She is not in acute distress  Appearance: Normal appearance  She is well-developed  She is not ill-appearing or toxic-appearing     HENT:      Head: Normocephalic and atraumatic  Right Ear: External ear normal       Left Ear: External ear normal       Mouth/Throat:      Pharynx: Oropharynx is clear  No oropharyngeal exudate or posterior oropharyngeal erythema  Cardiovascular:      Rate and Rhythm: Normal rate and regular rhythm  Pulses: Normal pulses  Heart sounds: Normal heart sounds  No murmur heard  Pulmonary:      Effort: Pulmonary effort is normal  No respiratory distress  Breath sounds: Normal breath sounds  No wheezing, rhonchi or rales  Chest:   Breasts:     Left: Tenderness present  No nipple discharge  Musculoskeletal:      Cervical back: Normal range of motion and neck supple  No tenderness  Skin:     General: Skin is warm and dry  Capillary Refill: Capillary refill takes less than 2 seconds  Neurological:      Mental Status: She is alert  ED Medications  Medications   cephalexin (KEFLEX) capsule 500 mg (500 mg Oral Given 1/10/23 2008)       Diagnostic Studies  Results Reviewed     None                 No orders to display         Procedures  Procedures      ED Course                                       Medical Decision Making  37F with no significant PMH presents to the emergency department with left breast pain and swelling  She reports that a loofah while in the shower causing a small skin tear  She reports drainage from the wound and progressive swelling, redness, tenderness and daily for the left nipple  She has been on Bactrim for 3 days without improvement of symptoms  On exam patient has warmth, redness, swelling of the left nipple without any obvious fluctuance  Ultrasound of left nipple reveals a small pocket of fluid directly deep to the nipple  This would be a difficult area to drain and patient reports she would not like drainage at this time anyway    With worsening of symptoms on Bactrim patient is changed to Keflex and advised to follow-up with her primary care provider for further evaluation and soon as possible  Additionally patient is advised to return to the emergency department if symptoms worsen at all or she develops fevers as she may require evaluation by the surgical team if symptoms worsen  There is no crepitus suggesting necrotizing fasciitis and patient's pain is not severe  In the presence of left breast cellulitis and possible small abscess patient is appropriate for discharge home with oral antibiotics at this time  Return precautions are discussed with the patient and they demonstrate understanding of the plan  The patient's questions are all answered to the their satisfaction and the patient is discharged home  Breast abscess: acute illness or injury  Cellulitis of left breast: acute illness or injury  Risk  Prescription drug management  Disposition  Final diagnoses:   Cellulitis of left breast   Breast abscess - Left     Time reflects when diagnosis was documented in both MDM as applicable and the Disposition within this note     Time User Action Codes Description Comment    1/10/2023  7:55 PM Jordana Moan Add [N61 0] Cellulitis of left breast     1/10/2023  7:55 PM Jordana Moan Add [N61 1] Breast abscess     1/10/2023  7:55 PM Jordana Moan Modify [N61 1] Breast abscess Left      ED Disposition     ED Disposition   Discharge    Condition   Stable    Date/Time   Tue Francesco 10, 2023  7:55 PM    Comment   Mc Thapa discharge to home/self care                 Follow-up Information     Follow up With Specialties Details Why Contact Info Additional Information    Ana Castellon, DO Family Medicine Call  If symptoms do not improve over the next several days [address] [place] [address]       [facility] Emergency Medicine Go to  If your pain becomes severe, swelling becomes severe, or you begin to notice systemic symptoms such as fevers, chills, or body aches [address]  [phone] [facility], [address], [place], [place], [address]          Discharge Medication List as of 1/10/2023  7:59 PM      START taking these medications    Details   cephalexin (KEFLEX) 500 mg capsule Take 1 capsule (500 mg total) by mouth every 6 (six) hours for 10 days, Starting Tue 1/10/2023, Until Fri 1/20/2023, Normal         CONTINUE these medications which have NOT CHANGED    Details   cholecalciferol (VITAMIN D3) 1,000 units tablet Take 1,000 Units by mouth daily, Historical Med      Cholecalciferol 1 25 MG (38968 UT) capsule Take 1 capsule by mouth once a week, Starting Mon 5/30/2022, Until Mon 7/25/2022, Historical Med      ferrous sulfate 325 (65 Fe) mg tablet Take 325 mg by mouth daily with breakfast, Historical Med      ibuprofen (MOTRIN) 100 mg/5 mL suspension Take by mouth every 6 (six) hours as needed for mild pain, Historical Med           No discharge procedures on file  PDMP Review     None           ED Provider  Attending physically available and evaluated Mc Elier OGDEN managed the patient along with the ED Attending      Electronically Signed by         Jacqui Verduzco DO  01/11/23 0637

## 2023-01-11 NOTE — ED ATTENDING ATTESTATION
1/10/2023  IDmitry MD, saw and evaluated the patient  I have discussed the patient with the resident/non-physician practitioner and agree with the resident's/non-physician practitioner's findings, Plan of Care, and MDM as documented in the resident's/non-physician practitioner's note, except where noted  All available labs and Radiology studies were reviewed  I was present for key portions of any procedure(s) performed by the resident/non-physician practitioner and I was immediately available to provide assistance  At this point I agree with the current assessment done in the Emergency Department  I have conducted an independent evaluation of this patient a history and physical is as follows:    53-year-old female presents for evaluation of increased soreness and swelling about the left nipple over the last few days  States that 1 week ago she accidentally caught a shower poof on her left nipple ring which caused it to create a small tear in the skin  Piercing not removed  Had telemedicine visit with PCP and was started on Bactrim and mupirocin 4 days ago  She reports noticing some enlargement of the sore area underneath the nipple as well as some purulent drainage  On exam tissue deep to the nipple is indurated and tender  No fluctuance  No active drainage  She reports she would not currently want incision and drainage of an abscess  Not systemically ill  Reports she has been feeling nauseous when she takes Bactrim  Will change to Keflex  Discussed that if symptoms persistent she will need to return to the ED for surgical evaluation for possible drainage      ED Course         Critical Care Time  Procedures